# Patient Record
(demographics unavailable — no encounter records)

---

## 2025-04-09 NOTE — REASON FOR VISIT
[Initial Evaluation] : an initial evaluation [Mother] : mother [FreeTextEntry1] : concern for the right side of the back and head tilt

## 2025-04-09 NOTE — PHYSICAL EXAM
[FreeTextEntry1] : Head: mild left plagiocephaly neck: full symmetrical ROM, no cords palpable. Nontender clavicles upper extremity: full symmetrical passive ROM all joints without instability spine: no dimples or hairy patches, no evidence of scoliosis or excessive kyphosis. In prone position, he has a tendency to shift body to the left but can be easily centered without issue.  hips: stable, wide symmetrical abduction. neg galleazzi Neg asymmetry of thigh folds lower extremity: full ROM knees/ankles and feet. tibia vara noted bilaterally symmetrical No instability to stress of knees No clicking noted. ankle DF past neutral with knee extended. foot: no evidence of MA.

## 2025-04-09 NOTE — ASSESSMENT
[FreeTextEntry1] : Mild left plagiocephaly  The history for today's visit was obtained from the  parent due to age and therefore, the parent was used today as an independent historian.  The clinical exam was discussed with mother. Frequent changes in position and belly time discussed.  As far as the hard area mother noted, this is most likely due to the child twisting body to the left and mother is noting the ribs being prominent in this position. Mother instructed on positioning the child to neutral this is no longer present. There are no concerns on exam today. Reassurance given to mother. He will f/u if further concerns arise. All questions answered. Parent in agreement with the plan.  IDelfina MPAS, PAC, have acted as a scribe and documented the above for Dr. Ramos.   The above documentation completed by the PA is an accurate record of both my words and actions. Martinez Ramos MD.  This note was generated using Dragon medical dictation software.  A reasonable effort has been made for proofreading its contents, but typos may still remain.  If there are any questions or points of clarification needed please do not hesitate to contact my office.

## 2025-04-09 NOTE — HISTORY OF PRESENT ILLNESS
[0] : currently ~his/her~ pain is 0 out of 10 [FreeTextEntry1] : 6-month-old baby boy presents with his mother for evaluation of his spine as well as a slight head tilt to the left.  He was a full-term baby born by vaginal delivery at 7.5 pounds.  He did not require NICU stay.  He is currently not rolling over as per mother.  There is concern that the child's grandmother noticed some firmness to the one side of the back when the child was prone. Mother also noted his head tilting to the left.

## 2025-04-09 NOTE — CONSULT LETTER
[Dear  ___] : Dear  [unfilled], [Consult Letter:] : I had the pleasure of evaluating your patient, [unfilled]. [Please see my note below.] : Please see my note below. [Consult Closing:] : Thank you very much for allowing me to participate in the care of this patient.  If you have any questions, please do not hesitate to contact me. [Sincerely,] : Sincerely, [FreeTextEntry3] : Martinez Ramos MD Division of Pediatric Orthopaedics [place] [address] [phone] fax: [phone]

## 2025-04-09 NOTE — REVIEW OF SYSTEMS
[Change in Activity] : no change in activity [Rash] : no rash [Heart Problems] : no heart problems [Congestion] : no congestion [Joint Pains] : no arthralgias